# Patient Record
Sex: FEMALE | Race: BLACK OR AFRICAN AMERICAN | NOT HISPANIC OR LATINO | URBAN - METROPOLITAN AREA
[De-identification: names, ages, dates, MRNs, and addresses within clinical notes are randomized per-mention and may not be internally consistent; named-entity substitution may affect disease eponyms.]

---

## 2022-04-20 ENCOUNTER — OFFICE VISIT (OUTPATIENT)
Dept: URBAN - METROPOLITAN AREA CLINIC 48 | Facility: CLINIC | Age: 57
End: 2022-04-20

## 2022-06-13 ENCOUNTER — OFFICE VISIT (OUTPATIENT)
Dept: URBAN - METROPOLITAN AREA CLINIC 48 | Facility: CLINIC | Age: 57
End: 2022-06-13
Payer: COMMERCIAL

## 2022-06-13 ENCOUNTER — LAB OUTSIDE AN ENCOUNTER (OUTPATIENT)
Dept: URBAN - METROPOLITAN AREA CLINIC 48 | Facility: CLINIC | Age: 57
End: 2022-06-13

## 2022-06-13 VITALS
BODY MASS INDEX: 32.46 KG/M2 | SYSTOLIC BLOOD PRESSURE: 146 MMHG | HEIGHT: 65 IN | WEIGHT: 194.8 LBS | DIASTOLIC BLOOD PRESSURE: 82 MMHG | HEART RATE: 64 BPM | TEMPERATURE: 97.3 F

## 2022-06-13 DIAGNOSIS — K58.2 MIXED IRRITABLE BOWEL SYNDROME: ICD-10-CM

## 2022-06-13 DIAGNOSIS — R10.13 DYSPEPSIA: ICD-10-CM

## 2022-06-13 PROCEDURE — 99204 OFFICE O/P NEW MOD 45 MIN: CPT | Performed by: INTERNAL MEDICINE

## 2022-06-13 RX ORDER — FINASTERIDE 5 MG/1
1/2 TABLET TABLET, FILM COATED ORAL ONCE A DAY
Status: ACTIVE | COMMUNITY

## 2022-06-13 RX ORDER — BETAMETHASONE DIPROPIONATE 0.5 MG/ML
APPLY 2-3 DROPS TO SCALP DAILY AS NEEDED FOR ITCH/TENDERNESS LOTION TOPICAL
Qty: 60 MILLILITER | Refills: 0 | Status: ACTIVE | COMMUNITY

## 2022-06-13 RX ORDER — PANTOPRAZOLE SODIUM 40 MG/1
1 TABLET TABLET, DELAYED RELEASE ORAL ONCE A DAY
Qty: 30 | OUTPATIENT
Start: 2022-06-13

## 2022-06-13 RX ORDER — ATORVASTATIN CALCIUM 10 MG/1
1 TABLET TABLET, FILM COATED ORAL ONCE A DAY
Qty: 30 TABLET | Refills: 0 | Status: ACTIVE | COMMUNITY

## 2022-06-13 RX ORDER — FLUOCINOLONE ACETONIDE 0.01 MG/100ML
OIL TOPICAL
Qty: 118.28 MILLILITER | Status: ACTIVE | COMMUNITY

## 2022-06-13 RX ORDER — GLUCOSAM/CHONDRO/HERB 149/HYAL 750-100 MG
1 CAPSULE TABLET ORAL ONCE A DAY
Status: ACTIVE | COMMUNITY

## 2022-06-13 RX ORDER — AMLODIPINE BESYLATE 5 MG/1
1 TABLET TABLET ORAL ONCE A DAY
Qty: 90 TABLET | Status: ACTIVE | COMMUNITY

## 2022-06-13 NOTE — HPI-TODAY'S VISIT:
57 yo F presents as a new patient for an OV to discuss abdominal pain. The patient mentions abdominal pain x several years. Associates chest burning with her symptoms, fear of eating and unintentional weight loss(unclear amount). Denies hematochezia.  Red sauce, citrus and fatty meals tend to cause the most discomfort. She has tried Pantoprazole 40 mg but thinks it only worked modestly. We discussed timing of this medication as she was taking this with meals and likely losing a significant effect.   EGD/Colonoscopy in 2020 at [facility] [place] revealed no pathology for her abdominal pain(biopsies negative for H pylori and only gastritis found). She also mentions changes in her bowel habits that include watery and hard stools that tend to change with different diet choices(soda and fatty meals cause diarrhea).   Surgical history reviewed, of note, she had an adrenal gland tumor resected(per her) and appendectomy.  MRI on 6/21 revealed a 1.5 cm tumor of the adrenal gland and cholelithiasis.

## 2022-07-05 ENCOUNTER — OFFICE VISIT (OUTPATIENT)
Dept: URBAN - METROPOLITAN AREA CLINIC 48 | Facility: CLINIC | Age: 57
End: 2022-07-05

## 2022-07-05 RX ORDER — GLUCOSAM/CHONDRO/HERB 149/HYAL 750-100 MG
1 CAPSULE TABLET ORAL ONCE A DAY
Status: ACTIVE | COMMUNITY

## 2022-07-05 RX ORDER — PANTOPRAZOLE SODIUM 40 MG/1
1 TABLET TABLET, DELAYED RELEASE ORAL ONCE A DAY
Qty: 30 | Status: ACTIVE | COMMUNITY
Start: 2022-06-13

## 2022-07-05 RX ORDER — FLUOCINOLONE ACETONIDE 0.01 MG/100ML
OIL TOPICAL
Qty: 118.28 MILLILITER | Status: ACTIVE | COMMUNITY

## 2022-07-05 RX ORDER — ATORVASTATIN CALCIUM 10 MG/1
1 TABLET TABLET, FILM COATED ORAL ONCE A DAY
Qty: 30 TABLET | Refills: 0 | Status: ACTIVE | COMMUNITY

## 2022-07-05 RX ORDER — AMLODIPINE BESYLATE 5 MG/1
1 TABLET TABLET ORAL ONCE A DAY
Qty: 90 TABLET | Status: ACTIVE | COMMUNITY

## 2022-07-05 RX ORDER — FINASTERIDE 5 MG/1
1/2 TABLET TABLET, FILM COATED ORAL ONCE A DAY
Status: ACTIVE | COMMUNITY

## 2022-07-05 RX ORDER — BETAMETHASONE DIPROPIONATE 0.5 MG/ML
APPLY 2-3 DROPS TO SCALP DAILY AS NEEDED FOR ITCH/TENDERNESS LOTION TOPICAL
Qty: 60 MILLILITER | Refills: 0 | Status: ACTIVE | COMMUNITY

## 2022-07-19 ENCOUNTER — OFFICE VISIT (OUTPATIENT)
Dept: URBAN - METROPOLITAN AREA CLINIC 48 | Facility: CLINIC | Age: 57
End: 2022-07-19
Payer: COMMERCIAL

## 2022-07-19 VITALS
DIASTOLIC BLOOD PRESSURE: 83 MMHG | WEIGHT: 199 LBS | BODY MASS INDEX: 33.15 KG/M2 | SYSTOLIC BLOOD PRESSURE: 143 MMHG | TEMPERATURE: 97.9 F | HEIGHT: 65 IN | HEART RATE: 67 BPM

## 2022-07-19 DIAGNOSIS — R14.0 ABDOMINAL BLOATING: ICD-10-CM

## 2022-07-19 DIAGNOSIS — R10.13 EPIGASTRIC PAIN: ICD-10-CM

## 2022-07-19 DIAGNOSIS — K58.0 IRRITABLE BOWEL SYNDROME WITH DIARRHEA: ICD-10-CM

## 2022-07-19 DIAGNOSIS — K80.20 CALCULUS OF GALLBLADDER WITHOUT CHOLECYSTITIS WITHOUT OBSTRUCTION: ICD-10-CM

## 2022-07-19 PROBLEM — 129851009: Status: ACTIVE | Noted: 2022-07-19

## 2022-07-19 PROBLEM — 197125005: Status: ACTIVE | Noted: 2022-07-19

## 2022-07-19 PROCEDURE — 99214 OFFICE O/P EST MOD 30 MIN: CPT | Performed by: INTERNAL MEDICINE

## 2022-07-19 RX ORDER — ATORVASTATIN CALCIUM 10 MG/1
1 TABLET TABLET, FILM COATED ORAL ONCE A DAY
Qty: 30 TABLET | Refills: 0 | Status: ACTIVE | COMMUNITY

## 2022-07-19 RX ORDER — GLUCOSAM/CHONDRO/HERB 149/HYAL 750-100 MG
1 CAPSULE TABLET ORAL ONCE A DAY
Status: ACTIVE | COMMUNITY

## 2022-07-19 RX ORDER — AMLODIPINE BESYLATE 5 MG/1
1 TABLET TABLET ORAL ONCE A DAY
Qty: 90 TABLET | Status: ACTIVE | COMMUNITY

## 2022-07-19 RX ORDER — RIFAXIMIN 550 MG/1
1 TABLET TABLET ORAL THREE TIMES A DAY
Qty: 42 TABLET | Refills: 2 | OUTPATIENT

## 2022-07-19 RX ORDER — HYOSCYAMINE SULFATE 0.12 MG/1
1 TABLET UNDER THE TONGUE AND ALLOW TO DISSOLVE  AS NEEDED FOR ABDOMINAL PAIN TABLET SUBLINGUAL THREE TIMES A DAY
Qty: 90 | Refills: 2 | OUTPATIENT

## 2022-07-19 RX ORDER — FINASTERIDE 5 MG/1
1/2 TABLET TABLET, FILM COATED ORAL ONCE A DAY
Status: ACTIVE | COMMUNITY

## 2022-07-19 RX ORDER — NORTRIPTYLINE HYDROCHLORIDE 25 MG/1
1 CAPSULE CAPSULE ORAL
Qty: 30 | Refills: 3 | OUTPATIENT

## 2022-07-19 RX ORDER — PANTOPRAZOLE SODIUM 40 MG/1
1 TABLET TABLET, DELAYED RELEASE ORAL ONCE A DAY
Qty: 30 | Status: ACTIVE | COMMUNITY
Start: 2022-06-13

## 2022-07-19 NOTE — PHYSICAL EXAM GASTROINTESTINAL
Abdomen , soft, mid epigastric tenderness, nondistended , no guarding or rigidity , no masses palpable , normal bowel sounds , Liver and Spleen , no hepatomegaly present , no hepatosplenomegaly , liver nontender , spleen not palpable

## 2022-07-19 NOTE — HPI-TODAY'S VISIT:
58 yo F presents for follow up of abdominal pain. She reports IBS for years. The patient has acute onset of epigastric pain followed by explosive diarrhea. It is impacting her life. She takes Ibuprofen or Tylenol prn. The patient also has increased abdominal bloating and her abdomen feels hard. Symptoms can be relieved at times by having a bowel movement. Currently, she is still on Pantoprazole 40mg. US 7/12/22 showed a large mobile gallstone, otherwise, normal.  LFTs have been normal. Denies hematochezia.  EGD/Colonoscopy in 2020 at [facility] revealed no pathology for her abdominal pain (biopsies negative for H pylori and only gastritis found).  Surgical history reviewed, of note, she had an adrenal gland tumor resected(per her) and appendectomy.  MRI on 6/21 revealed a 1.5 cm tumor of the adrenal gland and cholelithiasis. She is to see her doctor at [facility] for follow up in December.

## 2022-08-22 ENCOUNTER — ERX REFILL RESPONSE (OUTPATIENT)
Dept: URBAN - METROPOLITAN AREA CLINIC 44 | Facility: CLINIC | Age: 57
End: 2022-08-22

## 2022-08-22 RX ORDER — RIFAXIMIN 550 MG/1
1 TABLET TABLET ORAL THREE TIMES A DAY
Qty: 42 TABLET | Refills: 2 | OUTPATIENT

## 2022-08-22 RX ORDER — RIFAXIMIN 550 MG/1
TAKE 1 TABLET BY MOUTH THREE TIMES DAILY FOR 14 DAYS TABLET ORAL
Qty: 42 TABLET | Refills: 2 | OUTPATIENT

## 2022-09-12 ENCOUNTER — ERX REFILL RESPONSE (OUTPATIENT)
Dept: URBAN - METROPOLITAN AREA CLINIC 44 | Facility: CLINIC | Age: 57
End: 2022-09-12

## 2022-09-12 RX ORDER — RIFAXIMIN 550 MG/1
TAKE 1 TABLET BY MOUTH THREE TIMES DAILY FOR 14 DAYS TABLET ORAL
Qty: 42 TABLET | Refills: 2 | OUTPATIENT

## 2022-10-27 ENCOUNTER — CLAIMS CREATED FROM THE CLAIM WINDOW (OUTPATIENT)
Dept: URBAN - METROPOLITAN AREA CLINIC 48 | Facility: CLINIC | Age: 57
End: 2022-10-27
Payer: COMMERCIAL

## 2022-10-27 VITALS
BODY MASS INDEX: 32.96 KG/M2 | DIASTOLIC BLOOD PRESSURE: 89 MMHG | SYSTOLIC BLOOD PRESSURE: 160 MMHG | WEIGHT: 197.8 LBS | HEIGHT: 65 IN | TEMPERATURE: 97.2 F | HEART RATE: 63 BPM

## 2022-10-27 DIAGNOSIS — R68.81 EARLY SATIETY: ICD-10-CM

## 2022-10-27 DIAGNOSIS — K80.20 CALCULUS OF GALLBLADDER WITHOUT CHOLECYSTITIS WITHOUT OBSTRUCTION: ICD-10-CM

## 2022-10-27 DIAGNOSIS — R14.0 ABDOMINAL BLOATING: ICD-10-CM

## 2022-10-27 DIAGNOSIS — K58.2 MIXED IRRITABLE BOWEL SYNDROME: ICD-10-CM

## 2022-10-27 DIAGNOSIS — R10.13 EPIGASTRIC PAIN: ICD-10-CM

## 2022-10-27 PROCEDURE — 99214 OFFICE O/P EST MOD 30 MIN: CPT | Performed by: INTERNAL MEDICINE

## 2022-10-27 PROCEDURE — 99214 OFFICE O/P EST MOD 30 MIN: CPT | Performed by: NURSE PRACTITIONER

## 2022-10-27 RX ORDER — AMLODIPINE BESYLATE 5 MG/1
1 TABLET TABLET ORAL ONCE A DAY
Qty: 90 TABLET | Status: ACTIVE | COMMUNITY

## 2022-10-27 RX ORDER — GLUCOSAM/CHONDRO/HERB 149/HYAL 750-100 MG
1 CAPSULE TABLET ORAL ONCE A DAY
Status: ACTIVE | COMMUNITY

## 2022-10-27 RX ORDER — HYOSCYAMINE SULFATE 0.12 MG/1
1 TABLET UNDER THE TONGUE AND ALLOW TO DISSOLVE  AS NEEDED FOR ABDOMINAL PAIN TABLET SUBLINGUAL THREE TIMES A DAY
Qty: 90 | Refills: 2 | Status: ACTIVE | COMMUNITY

## 2022-10-27 RX ORDER — FINASTERIDE 5 MG/1
1/2 TABLET TABLET, FILM COATED ORAL ONCE A DAY
Status: ACTIVE | COMMUNITY

## 2022-10-27 NOTE — HPI-TODAY'S VISIT:
58 yo F presents for follow up of abdominal pain. She reports IBS for years. The patient has acute onset of epigastric pain followed by explosive diarrhea. It is impacting her life. The patient tried Xifaxan and Nortripyline for 1 week and did not feel improvement so she stopped. Probiotic daily has helped.  She continues to have adominal bloating and early satiety. The patient has noted some dietary triggers. She takes Ibuprofen or Tylenol prn. Symptoms can be relieved at times by having a bowel movement. Currently, she is still on Pantoprazole 40mg. US 7/12/22 showed a large mobile gallstone, otherwise, normal.  LFTs have been normal. Denies hematochezia. Her mother and brother had colon cancer, her cousin had pancreatic cancer,and sister has lung cancer.    EGD/Colonoscopy in 2020 at [facility] revealed no pathology for her abdominal pain (biopsies negative for H pylori and only gastritis found).  Surgical history reviewed, of note, she had an adrenal gland tumor resected(per her) and appendectomy.  MRI on 6/21 revealed a 1.5 cm tumor of the adrenal gland and cholelithiasis. She is to see her doctor at [facility] for follow up in December.

## 2022-10-30 LAB
H PYLORI BREATH TEST: NOT DETECTED
H. PYLORI BREATH TEST: NOT DETECTED
INTERPRETATION: NOT DETECTED

## 2022-12-08 ENCOUNTER — OFFICE VISIT (OUTPATIENT)
Dept: URBAN - METROPOLITAN AREA CLINIC 48 | Facility: CLINIC | Age: 57
End: 2022-12-08

## 2023-01-10 ENCOUNTER — OFFICE VISIT (OUTPATIENT)
Dept: URBAN - METROPOLITAN AREA CLINIC 48 | Facility: CLINIC | Age: 58
End: 2023-01-10
Payer: COMMERCIAL

## 2023-01-10 ENCOUNTER — DASHBOARD ENCOUNTERS (OUTPATIENT)
Age: 58
End: 2023-01-10

## 2023-01-10 ENCOUNTER — LAB OUTSIDE AN ENCOUNTER (OUTPATIENT)
Dept: URBAN - METROPOLITAN AREA CLINIC 48 | Facility: CLINIC | Age: 58
End: 2023-01-10

## 2023-01-10 VITALS
HEART RATE: 75 BPM | WEIGHT: 197.4 LBS | SYSTOLIC BLOOD PRESSURE: 150 MMHG | DIASTOLIC BLOOD PRESSURE: 85 MMHG | HEIGHT: 65 IN | TEMPERATURE: 97.5 F | BODY MASS INDEX: 32.89 KG/M2

## 2023-01-10 DIAGNOSIS — K58.2 MIXED IRRITABLE BOWEL SYNDROME: ICD-10-CM

## 2023-01-10 DIAGNOSIS — R14.0 ABDOMINAL BLOATING: ICD-10-CM

## 2023-01-10 DIAGNOSIS — K80.20 CALCULUS OF GALLBLADDER WITHOUT CHOLECYSTITIS WITHOUT OBSTRUCTION: ICD-10-CM

## 2023-01-10 DIAGNOSIS — R68.81 EARLY SATIETY: ICD-10-CM

## 2023-01-10 PROBLEM — 440544005: Status: ACTIVE | Noted: 2022-06-13

## 2023-01-10 PROBLEM — 363411007: Status: ACTIVE | Noted: 2023-01-10

## 2023-01-10 PROBLEM — 266435005: Status: ACTIVE | Noted: 2023-01-10

## 2023-01-10 PROBLEM — 442076002: Status: ACTIVE | Noted: 2023-01-10

## 2023-01-10 PROBLEM — 116289008: Status: ACTIVE | Noted: 2022-07-19

## 2023-01-10 PROBLEM — 70342003: Status: ACTIVE | Noted: 2022-07-19

## 2023-01-10 PROCEDURE — 99214 OFFICE O/P EST MOD 30 MIN: CPT | Performed by: INTERNAL MEDICINE

## 2023-01-10 RX ORDER — OMEPRAZOLE 40 MG/1
1 CAPSULE 30 MINUTES BEFORE MORNING MEAL CAPSULE, DELAYED RELEASE ORAL ONCE A DAY
Qty: 30 | Refills: 6 | OUTPATIENT

## 2023-01-10 RX ORDER — MINOXIDIL 2.5 MG/1
1 TABLET TABLET ORAL TWICE A DAY
Status: ACTIVE | COMMUNITY

## 2023-01-10 RX ORDER — FINASTERIDE 5 MG/1
1/2 TABLET TABLET, FILM COATED ORAL ONCE A DAY
Status: ACTIVE | COMMUNITY

## 2023-01-10 RX ORDER — NORTRIPTYLINE HYDROCHLORIDE 25 MG/1
1 CAPSULE CAPSULE ORAL
Refills: 6 | OUTPATIENT

## 2023-01-10 RX ORDER — AMLODIPINE BESYLATE 5 MG/1
1 TABLET TABLET ORAL ONCE A DAY
Qty: 90 TABLET | Status: ACTIVE | COMMUNITY

## 2023-01-10 RX ORDER — RIFAXIMIN 550 MG/1
1 TABLET TABLET ORAL TWICE A DAY
Status: ACTIVE | COMMUNITY

## 2023-01-10 RX ORDER — HYOSCYAMINE SULFATE 0.12 MG/1
1 TABLET UNDER THE TONGUE AND ALLOW TO DISSOLVE  AS NEEDED FOR ABDOMINAL PAIN TABLET SUBLINGUAL THREE TIMES A DAY
Qty: 90 | Refills: 2 | Status: ACTIVE | COMMUNITY

## 2023-01-10 RX ORDER — GLUCOSAM/CHONDRO/HERB 149/HYAL 750-100 MG
1 CAPSULE TABLET ORAL ONCE A DAY
Status: ACTIVE | COMMUNITY

## 2023-01-10 NOTE — HPI-TODAY'S VISIT:
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 She reports IBS for years. The patient tried Xifaxan and Nortripyline for 1 week and did not feel improvement so she stopped. Probiotic daily has helped. She takes Ibuprofen or Tylenol prn. Symptoms can be relieved at times by having a bowel movement. US 7/12/22 showed a large mobile gallstone, otherwise, normal.  LFTs have been normal. Denies hematochezia. Her mother and brother had colon cancer, her cousin had pancreatic cancer,and sister has lung cancer. Reports CT 1/6/22 that was done at the [facility] that showed liver cysts but otherwise negative (we do not have report).   EGD/Colonoscopy in 2020 at [facility] revealed no pathology for her abdominal pain (biopsies negative for H pylori and only gastritis found).  Surgical history reviewed, per note. The pt reports having an adrenal gland tumor resected but I did not see this in her oncology note. MRI on 6/21 revealed a 1.5 cm tumor of the adrenal gland and cholelithiasis.  Today  she presents early satiety, abdominal bloating, nausea, and mid to epigastric pain.   The patient has noted some dietary triggers. Bowel movements in 12/2022 were urgent and loose after her sister dying and going to [place]. Bowel movements are now to baseline with bm daily but at times she takes Hyoscyamine. Her oncologist referred her for a colonoscopy.

## 2023-01-12 PROBLEM — 725167001: Status: ACTIVE | Noted: 2023-01-10

## 2023-01-12 PROBLEM — 79922009: Status: ACTIVE | Noted: 2022-07-19

## 2023-01-20 ENCOUNTER — OFFICE VISIT (OUTPATIENT)
Dept: URBAN - METROPOLITAN AREA SURGERY CENTER 27 | Facility: SURGERY CENTER | Age: 58
End: 2023-01-20
Payer: COMMERCIAL

## 2023-01-20 DIAGNOSIS — K62.1 ANAL AND RECTAL POLYP: ICD-10-CM

## 2023-01-20 DIAGNOSIS — R19.4 ALTERATION IN BOWEL ELIMINATION: ICD-10-CM

## 2023-01-20 DIAGNOSIS — K30 ACID INDIGESTION: ICD-10-CM

## 2023-01-20 DIAGNOSIS — K29.50 ANTRAL GASTRITIS: ICD-10-CM

## 2023-01-20 PROCEDURE — 43239 EGD BIOPSY SINGLE/MULTIPLE: CPT | Performed by: INTERNAL MEDICINE

## 2023-01-20 PROCEDURE — 45385 COLONOSCOPY W/LESION REMOVAL: CPT | Performed by: INTERNAL MEDICINE

## 2023-01-20 PROCEDURE — G8907 PT DOC NO EVENTS ON DISCHARG: HCPCS | Performed by: INTERNAL MEDICINE

## 2023-01-20 RX ORDER — FINASTERIDE 5 MG/1
1/2 TABLET TABLET, FILM COATED ORAL ONCE A DAY
Status: ACTIVE | COMMUNITY

## 2023-01-20 RX ORDER — HYOSCYAMINE SULFATE 0.12 MG/1
1 TABLET UNDER THE TONGUE AND ALLOW TO DISSOLVE  AS NEEDED FOR ABDOMINAL PAIN TABLET SUBLINGUAL THREE TIMES A DAY
Qty: 90 | Refills: 2 | Status: ACTIVE | COMMUNITY

## 2023-01-20 RX ORDER — OMEPRAZOLE 40 MG/1
1 CAPSULE 30 MINUTES BEFORE MORNING MEAL CAPSULE, DELAYED RELEASE ORAL ONCE A DAY
Qty: 30 | Refills: 6 | Status: ACTIVE | COMMUNITY

## 2023-01-20 RX ORDER — NORTRIPTYLINE HYDROCHLORIDE 25 MG/1
1 CAPSULE CAPSULE ORAL
Refills: 6 | Status: ACTIVE | COMMUNITY

## 2023-01-20 RX ORDER — MINOXIDIL 2.5 MG/1
1 TABLET TABLET ORAL TWICE A DAY
Status: ACTIVE | COMMUNITY

## 2023-01-20 RX ORDER — RIFAXIMIN 550 MG/1
1 TABLET TABLET ORAL TWICE A DAY
Status: ACTIVE | COMMUNITY

## 2023-01-20 RX ORDER — GLUCOSAM/CHONDRO/HERB 149/HYAL 750-100 MG
1 CAPSULE TABLET ORAL ONCE A DAY
Status: ACTIVE | COMMUNITY

## 2023-01-20 RX ORDER — AMLODIPINE BESYLATE 5 MG/1
1 TABLET TABLET ORAL ONCE A DAY
Qty: 90 TABLET | Status: ACTIVE | COMMUNITY

## 2023-02-15 ENCOUNTER — TELEPHONE ENCOUNTER (OUTPATIENT)
Dept: URBAN - METROPOLITAN AREA CLINIC 48 | Facility: CLINIC | Age: 58
End: 2023-02-15

## 2023-03-08 ENCOUNTER — TELEPHONE ENCOUNTER (OUTPATIENT)
Dept: URBAN - METROPOLITAN AREA CLINIC 48 | Facility: CLINIC | Age: 58
End: 2023-03-08

## 2024-10-09 PROBLEM — 440630006: Status: ACTIVE | Noted: 2024-10-09

## 2024-10-10 ENCOUNTER — OFFICE VISIT (OUTPATIENT)
Dept: URBAN - METROPOLITAN AREA CLINIC 46 | Facility: CLINIC | Age: 59
End: 2024-10-10
Payer: COMMERCIAL

## 2024-10-10 ENCOUNTER — LAB OUTSIDE AN ENCOUNTER (OUTPATIENT)
Dept: URBAN - METROPOLITAN AREA CLINIC 46 | Facility: CLINIC | Age: 59
End: 2024-10-10

## 2024-10-10 VITALS
DIASTOLIC BLOOD PRESSURE: 81 MMHG | BODY MASS INDEX: 32.19 KG/M2 | SYSTOLIC BLOOD PRESSURE: 134 MMHG | HEIGHT: 65 IN | WEIGHT: 193.2 LBS | HEART RATE: 69 BPM | TEMPERATURE: 97.8 F

## 2024-10-10 DIAGNOSIS — Z80.0 FAMILY HISTORY OF COLON CANCER: ICD-10-CM

## 2024-10-10 DIAGNOSIS — R10.13 DYSPEPSIA: ICD-10-CM

## 2024-10-10 DIAGNOSIS — K58.1 IRRITABLE BOWEL SYNDROME WITH CONSTIPATION: ICD-10-CM

## 2024-10-10 PROBLEM — 3696007: Status: ACTIVE | Noted: 2024-10-10

## 2024-10-10 PROCEDURE — 99214 OFFICE O/P EST MOD 30 MIN: CPT | Performed by: INTERNAL MEDICINE

## 2024-10-10 RX ORDER — OMEPRAZOLE 40 MG/1
1 CAPSULE 30 MINUTES BEFORE MORNING MEAL CAPSULE, DELAYED RELEASE ORAL ONCE A DAY
Qty: 90 | Refills: 3 | OUTPATIENT
Start: 2024-10-10

## 2024-10-10 RX ORDER — AMLODIPINE BESYLATE 5 MG/1
1 TABLET TABLET ORAL ONCE A DAY
Qty: 90 TABLET | Status: ACTIVE | COMMUNITY

## 2024-10-10 RX ORDER — HYOSCYAMINE SULFATE 0.12 MG/1
1 TABLET UNDER THE TONGUE AND ALLOW TO DISSOLVE  AS NEEDED FOR ABDOMINAL PAIN TABLET SUBLINGUAL THREE TIMES A DAY
Qty: 90 | Refills: 2 | Status: ACTIVE | COMMUNITY

## 2024-10-10 NOTE — HPI-TODAY'S VISIT:
59-year-old female presents for evaluation abdominal burning, constipation.  Last OV 1/10/2023 for abdominal pain follow-up.  Patient has history of neuroendocrine tumor of appendix diagnosed in 2018. Patient has family history CRC in her mother and brother, and pancreatic cancer in her cousin. She has reported IBS symptoms with alternating constipation and diarrhea for many years, and was previously treated with Xifaxan and nortriptyline with no improvement. Daily probiotic helped temporarily with her bowel irregularity and abdominal bloating/discomfort. She has also reported abdominal "burning" off and on for years. Abdominal MRI 6/2021 showed 1.5 cm tumor of adrenal gland, cholelithiasis. Patient reported CT 1/6/2022 at [facility] showed liver cyst, otherwise normal (we do not have report).Ultrasound 7/12/2022 showed large mobile gallstone, otherwise normal. At last OV, recommended nortriptyline 25mg at bedtime for IBS, probiotics for bloating, and omeprazole 4mg daily for dyspepsia. EGD 1/20/2023 was normal; chronic gastritis with no H. pylori on path. Colonoscopy 1/20/2023 with removal of hyperplastic polyp. CT abdomen pelvis 1/8/2024 with grossly stable left adrenal gland nodularity, otherwise unremarkable. Grossly stable hypodense hepatic lesions, likely cysts. Cholelithiasis.  Currently, patient reports worsened constipation x 1 week. Symptom onset after traveling and COVID-19 infection last month. She states she had loose stools while traveling, but over the past week was unable to have a BM until yesterday. She used OTC laxatives, green tea, and enema. After enema and digital disimpaction, patient evacuated large "ball" of stool. She notes bloating, upper abdominal pain which was partially improved following BM. Denies rectal bleeding, weight loss. She is no longer taking probiotic or nortryptiline for IBS/bloating.  She also states that she was given rx for Paxlovid after COVID infection, and it made her dyspepsia symptoms much worse. While on this medication, patient noted worsened upper abdominal "burning" pain, nausea with some non-bloody emesis, and increased reflux. She discontinued medication with improvement in sx. However, patient still has persistent dyspepsia symptoms since last OV, and states she feels burning in epigastric region, as well as early satiety. No melena or dysphagia. Denies NSAID use. She is not diabetic or on narcotic medications. She took a short course of Mounjaro a couple months ago, but since stopped this medication. Patient is not currently on any PPI or H2RBs for upper GI symptoms. Labs 5/30/2024 were unremarkable.  TSH normal.

## 2024-10-10 NOTE — PHYSICAL EXAM GASTROINTESTINAL
Abdomen , soft, mild tenderness in epigastric region, nondistended , no guarding or rigidity , no masses palpable , normal bowel sounds Liver and Spleen , no hepatosplenomegaly Rectal deferred

## 2024-12-19 ENCOUNTER — OFFICE VISIT (OUTPATIENT)
Dept: URBAN - METROPOLITAN AREA CLINIC 46 | Facility: CLINIC | Age: 59
End: 2024-12-19
Payer: COMMERCIAL

## 2024-12-19 VITALS
HEART RATE: 60 BPM | HEIGHT: 65 IN | SYSTOLIC BLOOD PRESSURE: 155 MMHG | TEMPERATURE: 97.7 F | BODY MASS INDEX: 31.89 KG/M2 | DIASTOLIC BLOOD PRESSURE: 82 MMHG | WEIGHT: 191.4 LBS

## 2024-12-19 DIAGNOSIS — K58.2 MIXED IRRITABLE BOWEL SYNDROME: ICD-10-CM

## 2024-12-19 DIAGNOSIS — Z86.0101 PERSONAL HISTORY OF ADENOMATOUS AND SERRATED COLON POLYPS: ICD-10-CM

## 2024-12-19 DIAGNOSIS — R10.13 DYSPEPSIA: ICD-10-CM

## 2024-12-19 DIAGNOSIS — K31.84 GASTROPARESIS: ICD-10-CM

## 2024-12-19 DIAGNOSIS — Z80.0 FAMILY HISTORY OF COLON CANCER: ICD-10-CM

## 2024-12-19 PROBLEM — 235675006: Status: ACTIVE | Noted: 2024-12-19

## 2024-12-19 PROCEDURE — 99214 OFFICE O/P EST MOD 30 MIN: CPT | Performed by: INTERNAL MEDICINE

## 2024-12-19 RX ORDER — OMEPRAZOLE 40 MG/1
1 CAPSULE 30 MINUTES BEFORE MORNING MEAL CAPSULE, DELAYED RELEASE ORAL ONCE A DAY
Qty: 90 | Refills: 3 | Status: ACTIVE | COMMUNITY
Start: 2024-10-10

## 2024-12-19 RX ORDER — PANCRELIPASE 36000; 180000; 114000 [USP'U]/1; [USP'U]/1; [USP'U]/1
2 TABLETS WITH MEALS, AND 1 TABLET WITH SNACKS CAPSULE, DELAYED RELEASE PELLETS ORAL AS DIRECTED
Qty: 900 | Refills: 3 | OUTPATIENT
Start: 2024-12-19 | End: 2025-12-14

## 2024-12-19 RX ORDER — HYOSCYAMINE SULFATE 0.12 MG/1
1 TABLET UNDER THE TONGUE AND ALLOW TO DISSOLVE  AS NEEDED FOR ABDOMINAL PAIN TABLET SUBLINGUAL THREE TIMES A DAY
Qty: 90 | Refills: 2 | Status: ACTIVE | COMMUNITY

## 2024-12-19 RX ORDER — COLESTIPOL HYDROCHLORIDE 1 G/1
2 TABLETS TABLET, FILM COATED ORAL ONCE A DAY
Qty: 60 TABLET | Status: ACTIVE | COMMUNITY

## 2024-12-19 RX ORDER — AMLODIPINE BESYLATE 5 MG/1
1 TABLET TABLET ORAL ONCE A DAY
Qty: 90 TABLET | Status: ACTIVE | COMMUNITY

## 2024-12-19 RX ORDER — PRAVASTATIN SODIUM 20 MG/1
1 TABLET TABLET ORAL ONCE A DAY
Qty: 30 TABLET | Status: ACTIVE | COMMUNITY

## 2024-12-19 NOTE — HPI-TODAY'S VISIT:
59-year-old female presents for evaluation abdominal burning, constipation. Patient has history of neuroendocrine tumor of appendix diagnosed in 2018, still followed by [facility]; has follow up January 2025. Patient has family history CRC in her mother and brother, and pancreatic cancer in her cousin. She has reported IBS symptoms with alternating constipation and diarrhea for many years, and was previously treated with Xifaxan and nortriptyline with no improvement. Daily probiotic helped temporarily with her bowel irregularity and abdominal bloating/discomfort. She has also reported abdominal "burning" off and on for years. Abdominal MRI 6/2021 showed 1.5 cm tumor of adrenal gland, cholelithiasis. Patient reported CT 1/6/2022 at [facility] showed liver cyst, otherwise normal (we do not have report).Ultrasound 7/12/2022 showed large mobile gallstone, otherwise normal. At last OV, recommended nortriptyline 25mg at bedtime for IBS, probiotics for bloating, and omeprazole 4mg daily for dyspepsia. EGD 1/20/2023 was normal; chronic gastritis with no H. pylori on path. Colonoscopy 1/20/2023 with removal of hyperplastic polyp. CT abdomen pelvis 1/8/2024 with grossly stable left adrenal gland nodularity, otherwise unremarkable. Grossly stable hypodense hepatic lesions, likely cysts. Cholelithiasis.  She was seen in October 2024 for worsening constipation x 1 week, which required enema and digital disimpaction. Symptom onset after traveling and COVID-19 infection last month. She was advised to take fiber supplementation and Miralax.   She also states that she was given rx for Paxlovid after COVID infection, and it made her dyspepsia symptoms much worse. While on this medication, patient noted worsened upper abdominal "burning" pain, nausea with some non-bloody emesis, and increased reflux. She discontinued medication with improvement in sx. Denies NSAID use. She is not diabetic or on narcotic medications. She took a short course of Mounjaro a couple months ago, but since stopped this medication. Labs 5/30/2024 were unremarkable.  TSH normal. NM GE study was ordered and done yesterday which did show delayed empyting.  12/19/24 for follow up: Epigastric pain much improved with daily PPI which she took for about 6 weeks. She now has sypmtoms 1-2 times per week. She still has upper abdominal fullness, bloating, and nausea at times after meals. Her bowels are back to alternating between diarrhea, constipation, and normal stools; she does not have a bowel movement every day. Constipation can feel like "birthing a child." Her PCP gave her colestipol which she did not start. She takes Hyoscyamine about once a week for bouts of abdominal cramping which resolves symptoms.

## 2024-12-23 ENCOUNTER — TELEPHONE ENCOUNTER (OUTPATIENT)
Dept: URBAN - METROPOLITAN AREA CLINIC 46 | Facility: CLINIC | Age: 59
End: 2024-12-23

## 2025-02-27 ENCOUNTER — OFFICE VISIT (OUTPATIENT)
Dept: URBAN - METROPOLITAN AREA CLINIC 46 | Facility: CLINIC | Age: 60
End: 2025-02-27

## 2025-05-08 ENCOUNTER — OFFICE VISIT (OUTPATIENT)
Dept: URBAN - METROPOLITAN AREA CLINIC 46 | Facility: CLINIC | Age: 60
End: 2025-05-08